# Patient Record
Sex: MALE | NOT HISPANIC OR LATINO | Employment: FULL TIME | URBAN - METROPOLITAN AREA
[De-identification: names, ages, dates, MRNs, and addresses within clinical notes are randomized per-mention and may not be internally consistent; named-entity substitution may affect disease eponyms.]

---

## 2024-02-25 PROCEDURE — 96375 TX/PRO/DX INJ NEW DRUG ADDON: CPT | Performed by: EMERGENCY MEDICINE

## 2024-02-25 PROCEDURE — 96361 HYDRATE IV INFUSION ADD-ON: CPT | Performed by: EMERGENCY MEDICINE

## 2024-02-25 PROCEDURE — 99284 EMERGENCY DEPT VISIT MOD MDM: CPT | Mod: 25 | Performed by: EMERGENCY MEDICINE

## 2024-02-25 PROCEDURE — 96374 THER/PROPH/DIAG INJ IV PUSH: CPT | Performed by: EMERGENCY MEDICINE

## 2024-02-26 ENCOUNTER — APPOINTMENT (OUTPATIENT)
Dept: RADIOLOGY | Facility: HOSPITAL | Age: 29
End: 2024-02-26
Payer: COMMERCIAL

## 2024-02-26 ENCOUNTER — HOSPITAL ENCOUNTER (EMERGENCY)
Facility: HOSPITAL | Age: 29
Discharge: HOME | End: 2024-02-26
Attending: EMERGENCY MEDICINE
Payer: COMMERCIAL

## 2024-02-26 VITALS
DIASTOLIC BLOOD PRESSURE: 89 MMHG | HEIGHT: 69 IN | HEART RATE: 85 BPM | SYSTOLIC BLOOD PRESSURE: 138 MMHG | BODY MASS INDEX: 44.43 KG/M2 | RESPIRATION RATE: 16 BRPM | WEIGHT: 300 LBS | OXYGEN SATURATION: 98 % | TEMPERATURE: 97.5 F

## 2024-02-26 DIAGNOSIS — N23 RENAL COLIC ON LEFT SIDE: Primary | ICD-10-CM

## 2024-02-26 LAB
ALBUMIN SERPL-MCNC: 4.2 G/DL (ref 3.5–5)
ALP BLD-CCNC: 97 U/L (ref 35–125)
ALT SERPL-CCNC: 29 U/L (ref 5–40)
ANION GAP SERPL CALC-SCNC: 12 MMOL/L
APPEARANCE UR: CLEAR
AST SERPL-CCNC: 21 U/L (ref 5–40)
BASOPHILS # BLD AUTO: 0.09 X10*3/UL (ref 0–0.1)
BASOPHILS NFR BLD AUTO: 0.8 %
BILIRUB SERPL-MCNC: 0.6 MG/DL (ref 0.1–1.2)
BILIRUB UR STRIP.AUTO-MCNC: NEGATIVE MG/DL
BUN SERPL-MCNC: 14 MG/DL (ref 8–25)
CALCIUM SERPL-MCNC: 9.2 MG/DL (ref 8.5–10.4)
CHLORIDE SERPL-SCNC: 103 MMOL/L (ref 97–107)
CO2 SERPL-SCNC: 25 MMOL/L (ref 24–31)
COLOR UR: ABNORMAL
CREAT SERPL-MCNC: 1 MG/DL (ref 0.4–1.6)
EGFRCR SERPLBLD CKD-EPI 2021: >90 ML/MIN/1.73M*2
EOSINOPHIL # BLD AUTO: 0.2 X10*3/UL (ref 0–0.7)
EOSINOPHIL NFR BLD AUTO: 1.9 %
ERYTHROCYTE [DISTWIDTH] IN BLOOD BY AUTOMATED COUNT: 13.1 % (ref 11.5–14.5)
GLUCOSE SERPL-MCNC: 108 MG/DL (ref 65–99)
GLUCOSE UR STRIP.AUTO-MCNC: NORMAL MG/DL
HCT VFR BLD AUTO: 42.1 % (ref 41–52)
HGB BLD-MCNC: 14.1 G/DL (ref 13.5–17.5)
HOLD SPECIMEN: NORMAL
IMM GRANULOCYTES # BLD AUTO: 0.03 X10*3/UL (ref 0–0.7)
IMM GRANULOCYTES NFR BLD AUTO: 0.3 % (ref 0–0.9)
KETONES UR STRIP.AUTO-MCNC: NEGATIVE MG/DL
LEUKOCYTE ESTERASE UR QL STRIP.AUTO: NEGATIVE
LIPASE SERPL-CCNC: 29 U/L (ref 16–63)
LYMPHOCYTES # BLD AUTO: 3.17 X10*3/UL (ref 1.2–4.8)
LYMPHOCYTES NFR BLD AUTO: 29.9 %
MCH RBC QN AUTO: 27.2 PG (ref 26–34)
MCHC RBC AUTO-ENTMCNC: 33.5 G/DL (ref 32–36)
MCV RBC AUTO: 81 FL (ref 80–100)
MONOCYTES # BLD AUTO: 0.93 X10*3/UL (ref 0.1–1)
MONOCYTES NFR BLD AUTO: 8.8 %
MUCOUS THREADS #/AREA URNS AUTO: ABNORMAL /LPF
NEUTROPHILS # BLD AUTO: 6.18 X10*3/UL (ref 1.2–7.7)
NEUTROPHILS NFR BLD AUTO: 58.3 %
NITRITE UR QL STRIP.AUTO: NEGATIVE
NRBC BLD-RTO: 0 /100 WBCS (ref 0–0)
PH UR STRIP.AUTO: 5.5 [PH]
PLATELET # BLD AUTO: 308 X10*3/UL (ref 150–450)
POTASSIUM SERPL-SCNC: 4 MMOL/L (ref 3.4–5.1)
PROT SERPL-MCNC: 7.2 G/DL (ref 5.9–7.9)
PROT UR STRIP.AUTO-MCNC: ABNORMAL MG/DL
RBC # BLD AUTO: 5.19 X10*6/UL (ref 4.5–5.9)
RBC # UR STRIP.AUTO: ABNORMAL /UL
RBC #/AREA URNS AUTO: >20 /HPF
SODIUM SERPL-SCNC: 140 MMOL/L (ref 133–145)
SP GR UR STRIP.AUTO: 1.02
UROBILINOGEN UR STRIP.AUTO-MCNC: NORMAL MG/DL
WBC # BLD AUTO: 10.6 X10*3/UL (ref 4.4–11.3)
WBC #/AREA URNS AUTO: ABNORMAL /HPF

## 2024-02-26 PROCEDURE — 85025 COMPLETE CBC W/AUTO DIFF WBC: CPT | Performed by: EMERGENCY MEDICINE

## 2024-02-26 PROCEDURE — 74176 CT ABD & PELVIS W/O CONTRAST: CPT

## 2024-02-26 PROCEDURE — 2500000004 HC RX 250 GENERAL PHARMACY W/ HCPCS (ALT 636 FOR OP/ED): Performed by: EMERGENCY MEDICINE

## 2024-02-26 PROCEDURE — 36415 COLL VENOUS BLD VENIPUNCTURE: CPT | Performed by: EMERGENCY MEDICINE

## 2024-02-26 PROCEDURE — 80053 COMPREHEN METABOLIC PANEL: CPT | Performed by: EMERGENCY MEDICINE

## 2024-02-26 PROCEDURE — 81001 URINALYSIS AUTO W/SCOPE: CPT | Performed by: EMERGENCY MEDICINE

## 2024-02-26 PROCEDURE — 83690 ASSAY OF LIPASE: CPT | Performed by: EMERGENCY MEDICINE

## 2024-02-26 PROCEDURE — 74176 CT ABD & PELVIS W/O CONTRAST: CPT | Mod: FOREIGN READ | Performed by: RADIOLOGY

## 2024-02-26 RX ORDER — OXYCODONE AND ACETAMINOPHEN 5; 325 MG/1; MG/1
1 TABLET ORAL EVERY 6 HOURS PRN
Qty: 5 TABLET | Refills: 0 | Status: SHIPPED | OUTPATIENT
Start: 2024-02-26 | End: 2024-02-29

## 2024-02-26 RX ORDER — KETOROLAC TROMETHAMINE 30 MG/ML
30 INJECTION, SOLUTION INTRAMUSCULAR; INTRAVENOUS ONCE
Status: COMPLETED | OUTPATIENT
Start: 2024-02-26 | End: 2024-02-26

## 2024-02-26 RX ORDER — KETOROLAC TROMETHAMINE 10 MG/1
10 TABLET, FILM COATED ORAL EVERY 6 HOURS PRN
Qty: 20 TABLET | Refills: 0 | Status: SHIPPED | OUTPATIENT
Start: 2024-02-26 | End: 2024-03-02

## 2024-02-26 RX ORDER — ONDANSETRON HYDROCHLORIDE 2 MG/ML
4 INJECTION, SOLUTION INTRAVENOUS ONCE
Status: COMPLETED | OUTPATIENT
Start: 2024-02-26 | End: 2024-02-26

## 2024-02-26 RX ADMIN — SODIUM CHLORIDE 1000 ML: 900 INJECTION, SOLUTION INTRAVENOUS at 00:27

## 2024-02-26 RX ADMIN — ONDANSETRON 4 MG: 2 INJECTION INTRAMUSCULAR; INTRAVENOUS at 00:26

## 2024-02-26 RX ADMIN — KETOROLAC TROMETHAMINE 30 MG: 30 INJECTION INTRAMUSCULAR; INTRAVENOUS at 00:27

## 2024-02-26 ASSESSMENT — PAIN SCALES - GENERAL: PAINLEVEL_OUTOF10: 4

## 2024-02-26 ASSESSMENT — COLUMBIA-SUICIDE SEVERITY RATING SCALE - C-SSRS
6. HAVE YOU EVER DONE ANYTHING, STARTED TO DO ANYTHING, OR PREPARED TO DO ANYTHING TO END YOUR LIFE?: NO
1. IN THE PAST MONTH, HAVE YOU WISHED YOU WERE DEAD OR WISHED YOU COULD GO TO SLEEP AND NOT WAKE UP?: NO
2. HAVE YOU ACTUALLY HAD ANY THOUGHTS OF KILLING YOURSELF?: NO

## 2024-02-26 ASSESSMENT — PAIN DESCRIPTION - DESCRIPTORS
DESCRIPTORS: SHARP
DESCRIPTORS: SHARP

## 2024-02-26 ASSESSMENT — PAIN DESCRIPTION - LOCATION: LOCATION: ABDOMEN

## 2024-02-26 ASSESSMENT — PAIN DESCRIPTION - PAIN TYPE: TYPE: ACUTE PAIN

## 2024-02-26 ASSESSMENT — PAIN - FUNCTIONAL ASSESSMENT: PAIN_FUNCTIONAL_ASSESSMENT: 0-10

## 2024-02-26 ASSESSMENT — PAIN DESCRIPTION - FREQUENCY: FREQUENCY: CONSTANT/CONTINUOUS

## 2024-02-26 ASSESSMENT — PAIN DESCRIPTION - PROGRESSION: CLINICAL_PROGRESSION: GRADUALLY WORSENING

## 2024-02-26 ASSESSMENT — PAIN DESCRIPTION - ORIENTATION: ORIENTATION: LEFT;LOWER

## 2024-02-26 NOTE — Clinical Note
Braulio Rubi was seen and treated in our emergency department on 2/25/2024.  He may return to work on 02/27/2024.  Patient may return to work on the above date without restrictions     If you have any questions or concerns, please don't hesitate to call.      Sukhjinder Mccoy, DO

## 2024-02-26 NOTE — ED PROVIDER NOTES
HPI   Chief Complaint   Patient presents with    Abdominal Pain       Patient presents to the emergency department today with complaints of having pain to his left side.  The patient states that he had a history of kidney stones in the past.  The patient states last kidney stone was a number of years ago.  The patient states that he feels as though this pain is similar.  The patient states it started on his left side seem to be moving around to the left lower quadrant.  The patient states that he is having difficult time passing urine.  Patient states that he feels weak and dizzy.  Patient states that his pain level at the present time is an 8/10.  Patient states that it will go as high as 10/10.  Patient states that the pain has not radiated to the back and basically started in the left side.  Patient denies any recent injury.  Patient denies decreased appetite.  Patient states that he has had episodes of nausea and vomiting, but is mainly associated when the pain is severe.  Patient does not have a urologist that he follows up with as an outpatient       No data recorded        Patient History   No past medical history on file.  No past surgical history on file.  No family history on file.  Social History     Tobacco Use    Smoking status: Not on file    Smokeless tobacco: Not on file   Substance Use Topics    Alcohol use: Not on file    Drug use: Not on file       Physical Exam   ED Triage Vitals [02/26/24 0002]   Temperature Heart Rate Respirations BP   36.4 °C (97.5 °F) 85 16 138/89      Pulse Ox Temp Source Heart Rate Source Patient Position   98 % Oral Monitor --      BP Location FiO2 (%)     -- --       Physical Exam  Vitals and nursing note reviewed.   Constitutional:       General: He is not in acute distress.     Appearance: He is well-developed.   HENT:      Head: Normocephalic and atraumatic.   Eyes:      Conjunctiva/sclera: Conjunctivae normal.   Cardiovascular:      Rate and Rhythm: Normal rate and  regular rhythm.      Heart sounds: No murmur heard.  Pulmonary:      Effort: Pulmonary effort is normal. No respiratory distress.      Breath sounds: Normal breath sounds.   Abdominal:      Palpations: Abdomen is soft.      Tenderness: There is no abdominal tenderness.   Musculoskeletal:         General: No swelling.      Cervical back: Neck supple.   Skin:     General: Skin is warm and dry.      Capillary Refill: Capillary refill takes less than 2 seconds.   Neurological:      Mental Status: He is alert.   Psychiatric:         Mood and Affect: Mood normal.         ED Course & [facility]   ED Course as of 02/26/24 0138 Mon Feb 26, 2024 0133 Go over the patient's test results with him, he is aware the fact that the stone appears to have already passed.  Patient did have a stone in the urine that he had just put out.  Since there is no other significant maladies on CT scan the patient can be safely discharged home.  Patient also made aware the fact that his liver enzymes and his gallbladder appear to be normal. [FR]      ED Course User Index  [FR] Sukhjinder Mccoy DO         Diagnoses as of 02/26/24 0138   Renal colic on left side       Medical Decision Making  After my initial evaluation, patient will need to go for CT scan of the abdomen pelvis throughout possibility of an impacted kidney stone.  Patient will be given medication to help with the symptoms, I will start patient on IV fluids as well.    Amount and/or Complexity of Data Reviewed  Labs:  Decision-making details documented in ED Course.  Radiology:  Decision-making details documented in ED Course.      Labs Reviewed   COMPREHENSIVE METABOLIC PANEL - Abnormal       Result Value    Glucose 108 (*)     Sodium 140      Potassium 4.0      Chloride 103      Bicarbonate 25      Urea Nitrogen 14      Creatinine 1.00      eGFR >90      Calcium 9.2      Albumin 4.2      Alkaline Phosphatase 97      Total Protein 7.2      AST 21      Bilirubin, Total 0.6      ALT 29       Anion Gap 12     URINALYSIS WITH REFLEX CULTURE AND MICROSCOPIC - Abnormal    Color, Urine Light-Yellow      Appearance, Urine Clear      Specific Gravity, Urine 1.022      pH, Urine 5.5      Protein, Urine 10 (TRACE)      Glucose, Urine Normal      Blood, Urine OVER (3+) (*)     Ketones, Urine NEGATIVE      Bilirubin, Urine NEGATIVE      Urobilinogen, Urine Normal      Nitrite, Urine NEGATIVE      Leukocyte Esterase, Urine NEGATIVE     URINALYSIS MICROSCOPIC WITH REFLEX CULTURE - Abnormal    WBC, Urine 1-5      RBC, Urine >20 (*)     Mucus, Urine FEW     LIPASE - Normal    Lipase 29     CBC WITH AUTO DIFFERENTIAL    WBC 10.6      nRBC 0.0      RBC 5.19      Hemoglobin 14.1      Hematocrit 42.1      MCV 81      MCH 27.2      MCHC 33.5      RDW 13.1      Platelets 308      Neutrophils % 58.3      Immature Granulocytes %, Automated 0.3      Lymphocytes % 29.9      Monocytes % 8.8      Eosinophils % 1.9      Basophils % 0.8      Neutrophils Absolute 6.18      Immature Granulocytes Absolute, Automated 0.03      Lymphocytes Absolute 3.17      Monocytes Absolute 0.93      Eosinophils Absolute 0.20      Basophils Absolute 0.09     URINALYSIS WITH REFLEX CULTURE AND MICROSCOPIC    Narrative:     The following orders were created for panel order Urinalysis with Reflex Culture and Microscopic.  Procedure                               Abnormality         Status                     ---------                               -----------         ------                     Urinalysis with Reflex C...[[id number]]  Abnormal            Final result               Extra Urine Gray Tube[[id number]]                            In process                   Please view results for these tests on the individual orders.   EXTRA URINE GRAY TUBE      CT abdomen pelvis wo IV contrast   Final Result   No acute intra-abdominal pathology identified.  No evidence of renal   or ureteral obstruction.   Nonobstructing 1 mm calculus in the lower pole of  the left kidney.   Signed by Jeramy Sullivan           Procedure  Procedures     Sukhjinder Mccoy,   02/26/24 0138

## 2024-05-13 ENCOUNTER — APPOINTMENT (OUTPATIENT)
Dept: RADIOLOGY | Facility: HOSPITAL | Age: 29
End: 2024-05-13
Payer: COMMERCIAL

## 2024-05-13 ENCOUNTER — HOSPITAL ENCOUNTER (EMERGENCY)
Facility: HOSPITAL | Age: 29
Discharge: HOME | End: 2024-05-13
Payer: COMMERCIAL

## 2024-05-13 VITALS
SYSTOLIC BLOOD PRESSURE: 135 MMHG | RESPIRATION RATE: 18 BRPM | HEART RATE: 83 BPM | TEMPERATURE: 98.6 F | HEIGHT: 69 IN | DIASTOLIC BLOOD PRESSURE: 94 MMHG | WEIGHT: 290 LBS | OXYGEN SATURATION: 100 % | BODY MASS INDEX: 42.95 KG/M2

## 2024-05-13 DIAGNOSIS — S63.502A SPRAIN OF LEFT WRIST, INITIAL ENCOUNTER: ICD-10-CM

## 2024-05-13 DIAGNOSIS — S62.102A CLOSED FRACTURE OF LEFT WRIST, INITIAL ENCOUNTER: Primary | ICD-10-CM

## 2024-05-13 PROCEDURE — 73130 X-RAY EXAM OF HAND: CPT | Mod: LT

## 2024-05-13 PROCEDURE — 99284 EMERGENCY DEPT VISIT MOD MDM: CPT

## 2024-05-13 PROCEDURE — 73130 X-RAY EXAM OF HAND: CPT | Mod: LEFT SIDE | Performed by: STUDENT IN AN ORGANIZED HEALTH CARE EDUCATION/TRAINING PROGRAM

## 2024-05-13 PROCEDURE — 29125 APPL SHORT ARM SPLINT STATIC: CPT | Mod: LT

## 2024-05-13 PROCEDURE — 73110 X-RAY EXAM OF WRIST: CPT | Mod: LT

## 2024-05-13 PROCEDURE — 73110 X-RAY EXAM OF WRIST: CPT | Mod: LEFT SIDE | Performed by: STUDENT IN AN ORGANIZED HEALTH CARE EDUCATION/TRAINING PROGRAM

## 2024-05-13 RX ORDER — IBUPROFEN 600 MG/1
600 TABLET ORAL EVERY 6 HOURS PRN
Qty: 15 TABLET | Refills: 0 | Status: SHIPPED | OUTPATIENT
Start: 2024-05-13 | End: 2024-05-20

## 2024-05-13 ASSESSMENT — COLUMBIA-SUICIDE SEVERITY RATING SCALE - C-SSRS
2. HAVE YOU ACTUALLY HAD ANY THOUGHTS OF KILLING YOURSELF?: NO
1. IN THE PAST MONTH, HAVE YOU WISHED YOU WERE DEAD OR WISHED YOU COULD GO TO SLEEP AND NOT WAKE UP?: NO
6. HAVE YOU EVER DONE ANYTHING, STARTED TO DO ANYTHING, OR PREPARED TO DO ANYTHING TO END YOUR LIFE?: NO

## 2024-05-13 ASSESSMENT — PAIN - FUNCTIONAL ASSESSMENT: PAIN_FUNCTIONAL_ASSESSMENT: 0-10

## 2024-05-13 ASSESSMENT — PAIN SCALES - GENERAL: PAINLEVEL_OUTOF10: 4

## 2024-05-13 NOTE — ED TRIAGE NOTES
Pt reports yesterday he fell on right hand playing baseball. Pt states his pointer finger/ thumb area of  hand is most tender.

## 2024-05-13 NOTE — DISCHARGE INSTRUCTIONS
Be sure to follow up as directed in 1-2 days.  All of the details of your follow up instructions are detailed in the follow up section of this packet.   You have been splinted here in the Emergency Department.      You should NOT take this splint off until seen by a specialist for further evaluation as discussed.  Allow for the specialist to remove the splint.  Keep splint clean and dry.  If you wish to shower, use a garbage bag or other such method to ensure the splint stays dry.        Be sure to return to the ER without delay if you feel like your splint is too tight, your fingers/toes are turning colors or you begin to loose sensation or motor function of the affected area.      If you have been provided with a sling (based on the specific injury) wear the sling as often as possible and ensure prompt follow as directed.  *(if you have not been provided with a sling please disregard)    It is important to remember that your care does not end here and you must continue to monitor your condition closely. Please return to the emergency department for any worsening or concerning signs or symptoms as directed by our conversations and the discharge instructions. Otherwise please follow up with your doctor in 2 days if no better or worse. If you do not have a doctor please contact the referral number on your discharge instructions. Please contact any physician specialists provided in your discharge notes as it is very important to follow up with them regarding your condition. If you are unable to reach the physicians provided, please come back to the Emergency Department at any time.        Return to emergency room without delay for ANY new or worsening pains or for any other symptoms or concerns.

## 2024-05-13 NOTE — ED PROVIDER NOTES
HPI   Chief Complaint   Patient presents with    Hand Injury       HPI  28-year-old here for evaluation of left hand injury, tried to catch a ball while playing baseball, denies any other location of pain.  Movement but causes mild discomfort in the left hand.                  [place] Coma Scale Score: 15                     Patient History   No past medical history on file.  No past surgical history on file.  No family history on file.  Social History     Tobacco Use    Smoking status: Not on file    Smokeless tobacco: Not on file   Substance Use Topics    Alcohol use: Not on file    Drug use: Not on file       Physical Exam   ED Triage Vitals [05/13/24 1355]   Temperature Heart Rate Respirations BP   37 °C (98.6 °F) 86 18 (!) 148/93      Pulse Ox Temp Source Heart Rate Source Patient Position   98 % Tympanic -- --      BP Location FiO2 (%)     -- --       Physical Exam  GENERAL APPEARANCE: This patient is in no acute respiratory distress. Awake and alert.talking appropriately. Answering questions appropriately. No evidence of pressured speech     VITAL SIGNS: As per the nurses' triage record.     HEENT: Normocephalic, atraumatic.     NECK:  full gross ROM during exam    MUSCULOSKELETAL: Left hand mildly tender, distal sensation and motor function intact.  Full gross active range of motion. Ambulating on own with no acute difficulties     NEUROLOGICAL: Awake, alert and oriented x 3.    IMMUNOLOGICAL: No palpable lymphadenopathy or lymphatic streaking noted on visible skin.    DERM: No petechiae, rashes, or ecchymoses. on visible skin    PSYCH: mood and affect appear normal.    ED Course & MDM   Diagnoses as of 05/13/24 1533   Closed fracture of left wrist, initial encounter   Sprain of left wrist, initial encounter       Medical Decision Making  Parts of this chart have been completed using voice recognition software. Please excuse any errors of transcription.  My thought process and reason for plan has been  formulated from the time that I saw the patient until the time of disposition and is not specific to one specific moment during their visit and furthermore my MDM encompasses this entire chart and not only this text box.      HPI: Detailed above.    Exam: A medically appropriate exam performed, outlined above, given the known history and presentation.    History Limited by: Nothing    History obtained from: Patient    External/internal records reviewed: No external records reviewed    Social Determinants of Health considered during this visit: At home    Chronic conditions impacting care: None    Medications given during visit:  Medications - No data to display     Diagnostic/tests  Labs Reviewed - No data to display   XR hand left 3+ views   Final Result   Questionable linear lucency through scaphoid waist as described   above. This could be artifactual, however subtle nondisplaced   fracture can also be considered in the differential. Recommend   correlation with point tenderness in this location. A follow-up   radiograph can be obtained in 7-10 days for definitive evaluation.             MACRO:   None        Signed by: Cristina Theodore 5/13/2024 3:22 PM   Dictation workstation:   [id number]      XR wrist left 3+ views   Final Result   Questionable linear lucency through scaphoid waist as described   above. This could be artifactual, however subtle nondisplaced   fracture can also be considered in the differential. Recommend   correlation with point tenderness in this location. A follow-up   radiograph can be obtained in 7-10 days for definitive evaluation.             MACRO:   None        Signed by: Cristina Theodore 5/13/2024 3:22 PM   Dictation workstation:   [id number]             Considerations/further MDM:  I estimate there is low risk severe fracture/dislocation requiring admission.  I have accessed for and considered: Fracture, dislocation, compartment syndrome, DVT, arterial involvement, arthritis,  tendon or neurovascular compromise or dysfunction, cellulitis, dislocation.     Thus I consider the discharge disposition reasonable.     We have discussed the diagnosis and risks, and we agree with discharging home to follow-up with their primary doctor or the referral orthopedist. Close follow up will be provided, along with my concerns and risks for the patient if they choose not to follow up as directed.      Out of concern for possible scaphoid fracture a thumb spica Ortho-Glass splint will be given and close return precautions follow-up instructions to orthopedic specialist,     definitive fracture management for the scaphoid fracture provided.    Splint placed by ED staff, after placement I inspected, good positioning and immobilization, the patient tolerated well, there is good anatomical positioning and immobilization with good neurovascular status postop placement of splint    Procedure  Procedures     Duane Ham PA-C  05/13/24 1535

## 2025-02-24 ENCOUNTER — APPOINTMENT (OUTPATIENT)
Dept: RADIOLOGY | Facility: HOSPITAL | Age: 30
End: 2025-02-24
Payer: COMMERCIAL

## 2025-02-24 ENCOUNTER — APPOINTMENT (OUTPATIENT)
Dept: CARDIOLOGY | Facility: HOSPITAL | Age: 30
End: 2025-02-24
Payer: COMMERCIAL

## 2025-02-24 ENCOUNTER — HOSPITAL ENCOUNTER (EMERGENCY)
Facility: HOSPITAL | Age: 30
Discharge: HOME | End: 2025-02-25
Attending: STUDENT IN AN ORGANIZED HEALTH CARE EDUCATION/TRAINING PROGRAM
Payer: COMMERCIAL

## 2025-02-24 DIAGNOSIS — R07.9 CHEST PAIN, UNSPECIFIED TYPE: Primary | ICD-10-CM

## 2025-02-24 DIAGNOSIS — K76.0 FATTY LIVER: ICD-10-CM

## 2025-02-24 DIAGNOSIS — K44.9 HIATAL HERNIA: ICD-10-CM

## 2025-02-24 LAB
ALBUMIN SERPL BCP-MCNC: 4.1 G/DL (ref 3.4–5)
ALP SERPL-CCNC: 79 U/L (ref 33–120)
ALT SERPL W P-5'-P-CCNC: 28 U/L (ref 10–52)
ANION GAP SERPL CALCULATED.3IONS-SCNC: 13 MMOL/L (ref 10–20)
AST SERPL W P-5'-P-CCNC: 15 U/L (ref 9–39)
BASOPHILS # BLD AUTO: 0.04 X10*3/UL (ref 0–0.1)
BASOPHILS NFR BLD AUTO: 0.6 %
BILIRUB SERPL-MCNC: 1.4 MG/DL (ref 0–1.2)
BUN SERPL-MCNC: 13 MG/DL (ref 6–23)
CALCIUM SERPL-MCNC: 8.9 MG/DL (ref 8.6–10.3)
CARDIAC TROPONIN I PNL SERPL HS: 4 NG/L (ref 0–20)
CHLORIDE SERPL-SCNC: 103 MMOL/L (ref 98–107)
CO2 SERPL-SCNC: 24 MMOL/L (ref 21–32)
CREAT SERPL-MCNC: 0.95 MG/DL (ref 0.5–1.3)
D DIMER PPP FEU-MCNC: 1.01 MG/L FEU (ref 0.19–0.5)
EGFRCR SERPLBLD CKD-EPI 2021: >90 ML/MIN/1.73M*2
EOSINOPHIL # BLD AUTO: 0.07 X10*3/UL (ref 0–0.7)
EOSINOPHIL NFR BLD AUTO: 1 %
ERYTHROCYTE [DISTWIDTH] IN BLOOD BY AUTOMATED COUNT: 13.8 % (ref 11.5–14.5)
FLUAV RNA RESP QL NAA+PROBE: NOT DETECTED
FLUBV RNA RESP QL NAA+PROBE: NOT DETECTED
GLUCOSE SERPL-MCNC: 117 MG/DL (ref 74–99)
HCT VFR BLD AUTO: 43.3 % (ref 41–52)
HGB BLD-MCNC: 14.4 G/DL (ref 13.5–17.5)
IMM GRANULOCYTES # BLD AUTO: 0.04 X10*3/UL (ref 0–0.7)
IMM GRANULOCYTES NFR BLD AUTO: 0.6 % (ref 0–0.9)
LYMPHOCYTES # BLD AUTO: 0.95 X10*3/UL (ref 1.2–4.8)
LYMPHOCYTES NFR BLD AUTO: 13.6 %
MCH RBC QN AUTO: 26.7 PG (ref 26–34)
MCHC RBC AUTO-ENTMCNC: 33.3 G/DL (ref 32–36)
MCV RBC AUTO: 80 FL (ref 80–100)
MONOCYTES # BLD AUTO: 0.87 X10*3/UL (ref 0.1–1)
MONOCYTES NFR BLD AUTO: 12.4 %
NEUTROPHILS # BLD AUTO: 5.02 X10*3/UL (ref 1.2–7.7)
NEUTROPHILS NFR BLD AUTO: 71.8 %
NRBC BLD-RTO: 0 /100 WBCS (ref 0–0)
PLATELET # BLD AUTO: 290 X10*3/UL (ref 150–450)
POTASSIUM SERPL-SCNC: 3.5 MMOL/L (ref 3.5–5.3)
PROT SERPL-MCNC: 7.2 G/DL (ref 6.4–8.2)
RBC # BLD AUTO: 5.39 X10*6/UL (ref 4.5–5.9)
SARS-COV-2 RNA RESP QL NAA+PROBE: NOT DETECTED
SODIUM SERPL-SCNC: 136 MMOL/L (ref 136–145)
WBC # BLD AUTO: 7 X10*3/UL (ref 4.4–11.3)

## 2025-02-24 PROCEDURE — 85300 ANTITHROMBIN III ACTIVITY: CPT | Performed by: PHYSICIAN ASSISTANT

## 2025-02-24 PROCEDURE — 87636 SARSCOV2 & INF A&B AMP PRB: CPT | Performed by: PHYSICIAN ASSISTANT

## 2025-02-24 PROCEDURE — 71045 X-RAY EXAM CHEST 1 VIEW: CPT

## 2025-02-24 PROCEDURE — 36415 COLL VENOUS BLD VENIPUNCTURE: CPT | Performed by: PHYSICIAN ASSISTANT

## 2025-02-24 PROCEDURE — 2500000001 HC RX 250 WO HCPCS SELF ADMINISTERED DRUGS (ALT 637 FOR MEDICARE OP): Performed by: STUDENT IN AN ORGANIZED HEALTH CARE EDUCATION/TRAINING PROGRAM

## 2025-02-24 PROCEDURE — 80053 COMPREHEN METABOLIC PANEL: CPT | Performed by: PHYSICIAN ASSISTANT

## 2025-02-24 PROCEDURE — 71045 X-RAY EXAM CHEST 1 VIEW: CPT | Performed by: RADIOLOGY

## 2025-02-24 PROCEDURE — 93005 ELECTROCARDIOGRAM TRACING: CPT

## 2025-02-24 PROCEDURE — 2500000004 HC RX 250 GENERAL PHARMACY W/ HCPCS (ALT 636 FOR OP/ED): Performed by: PHYSICIAN ASSISTANT

## 2025-02-24 PROCEDURE — 85025 COMPLETE CBC W/AUTO DIFF WBC: CPT | Performed by: PHYSICIAN ASSISTANT

## 2025-02-24 PROCEDURE — 99285 EMERGENCY DEPT VISIT HI MDM: CPT | Mod: 25 | Performed by: STUDENT IN AN ORGANIZED HEALTH CARE EDUCATION/TRAINING PROGRAM

## 2025-02-24 PROCEDURE — 96360 HYDRATION IV INFUSION INIT: CPT

## 2025-02-24 PROCEDURE — 2500000005 HC RX 250 GENERAL PHARMACY W/O HCPCS: Performed by: STUDENT IN AN ORGANIZED HEALTH CARE EDUCATION/TRAINING PROGRAM

## 2025-02-24 PROCEDURE — 84484 ASSAY OF TROPONIN QUANT: CPT | Performed by: PHYSICIAN ASSISTANT

## 2025-02-24 RX ORDER — LIDOCAINE HYDROCHLORIDE 20 MG/ML
15 SOLUTION OROPHARYNGEAL ONCE
Status: COMPLETED | OUTPATIENT
Start: 2025-02-24 | End: 2025-02-24

## 2025-02-24 RX ORDER — ALUMINUM HYDROXIDE, MAGNESIUM HYDROXIDE, AND SIMETHICONE 1200; 120; 1200 MG/30ML; MG/30ML; MG/30ML
30 SUSPENSION ORAL ONCE
Status: COMPLETED | OUTPATIENT
Start: 2025-02-24 | End: 2025-02-24

## 2025-02-24 RX ADMIN — SODIUM CHLORIDE 1000 ML: 900 INJECTION, SOLUTION INTRAVENOUS at 23:09

## 2025-02-24 RX ADMIN — ALUMINUM HYDROXIDE, MAGNESIUM HYDROXIDE, AND SIMETHICONE 30 ML: 200; 200; 20 SUSPENSION ORAL at 23:13

## 2025-02-24 RX ADMIN — LIDOCAINE HYDROCHLORIDE 15 ML: 20 SOLUTION ORAL at 23:13

## 2025-02-24 ASSESSMENT — PAIN - FUNCTIONAL ASSESSMENT: PAIN_FUNCTIONAL_ASSESSMENT: 0-10

## 2025-02-24 ASSESSMENT — COLUMBIA-SUICIDE SEVERITY RATING SCALE - C-SSRS
6. HAVE YOU EVER DONE ANYTHING, STARTED TO DO ANYTHING, OR PREPARED TO DO ANYTHING TO END YOUR LIFE?: NO
2. HAVE YOU ACTUALLY HAD ANY THOUGHTS OF KILLING YOURSELF?: NO
1. IN THE PAST MONTH, HAVE YOU WISHED YOU WERE DEAD OR WISHED YOU COULD GO TO SLEEP AND NOT WAKE UP?: NO

## 2025-02-24 ASSESSMENT — PAIN DESCRIPTION - LOCATION
LOCATION: CHEST
LOCATION: CHEST

## 2025-02-24 ASSESSMENT — PAIN SCALES - PAIN ASSESSMENT IN ADVANCED DEMENTIA (PAINAD): TOTALSCORE: MEDICATION (SEE MAR)

## 2025-02-24 ASSESSMENT — PAIN SCALES - GENERAL
PAINLEVEL_OUTOF10: 3
PAINLEVEL_OUTOF10: 5 - MODERATE PAIN

## 2025-02-24 ASSESSMENT — PAIN DESCRIPTION - ORIENTATION: ORIENTATION: MID

## 2025-02-24 NOTE — LETTER
February 25, 2025    Patient: Braulio Rubi   YOB: 1995   Date of Visit: 2/24/2025       To Whom It May Concern:    Braulio Rubi was seen and treated in our emergency department on 2/24/2025. He may return back to work on 2/26/2025.     If you have any questions or concerns, please don't hesitate to call.     Dr. Irving Griffin, DO    CC: No Recipients

## 2025-02-25 ENCOUNTER — APPOINTMENT (OUTPATIENT)
Dept: RADIOLOGY | Facility: HOSPITAL | Age: 30
End: 2025-02-25
Payer: COMMERCIAL

## 2025-02-25 VITALS
HEIGHT: 69 IN | DIASTOLIC BLOOD PRESSURE: 89 MMHG | BODY MASS INDEX: 46.65 KG/M2 | HEART RATE: 78 BPM | RESPIRATION RATE: 15 BRPM | WEIGHT: 315 LBS | TEMPERATURE: 98.4 F | OXYGEN SATURATION: 97 % | SYSTOLIC BLOOD PRESSURE: 150 MMHG

## 2025-02-25 LAB
ATRIAL RATE: 100 BPM
CARDIAC TROPONIN I PNL SERPL HS: 4 NG/L (ref 0–20)
P AXIS: 54 DEGREES
P OFFSET: 199 MS
P ONSET: 151 MS
PR INTERVAL: 132 MS
Q ONSET: 217 MS
QRS COUNT: 17 BEATS
QRS DURATION: 90 MS
QT INTERVAL: 348 MS
QTC CALCULATION(BAZETT): 448 MS
QTC FREDERICIA: 412 MS
R AXIS: 49 DEGREES
T AXIS: 157 DEGREES
T OFFSET: 391 MS
VENTRICULAR RATE: 100 BPM

## 2025-02-25 PROCEDURE — 36415 COLL VENOUS BLD VENIPUNCTURE: CPT | Performed by: PHYSICIAN ASSISTANT

## 2025-02-25 PROCEDURE — 2550000001 HC RX 255 CONTRASTS: Performed by: STUDENT IN AN ORGANIZED HEALTH CARE EDUCATION/TRAINING PROGRAM

## 2025-02-25 PROCEDURE — 71275 CT ANGIOGRAPHY CHEST: CPT

## 2025-02-25 PROCEDURE — 84484 ASSAY OF TROPONIN QUANT: CPT | Performed by: PHYSICIAN ASSISTANT

## 2025-02-25 RX ADMIN — IOHEXOL 75 ML: 350 INJECTION, SOLUTION INTRAVENOUS at 01:04

## 2025-02-25 ASSESSMENT — HEART SCORE
RISK FACTORS: 1-2 RISK FACTORS
AGE: <45
ECG: NORMAL
TROPONIN: LESS THAN OR EQUAL TO NORMAL LIMIT
HISTORY: SLIGHTLY SUSPICIOUS
HEART SCORE: 1

## 2025-02-25 ASSESSMENT — LIFESTYLE VARIABLES
EVER FELT BAD OR GUILTY ABOUT YOUR DRINKING: NO
HAVE PEOPLE ANNOYED YOU BY CRITICIZING YOUR DRINKING: NO
HAVE YOU EVER FELT YOU SHOULD CUT DOWN ON YOUR DRINKING: NO
TOTAL SCORE: 0
EVER HAD A DRINK FIRST THING IN THE MORNING TO STEADY YOUR NERVES TO GET RID OF A HANGOVER: NO

## 2025-02-25 ASSESSMENT — PAIN SCALES - GENERAL
PAINLEVEL_OUTOF10: 0 - NO PAIN
PAINLEVEL_OUTOF10: 0 - NO PAIN

## 2025-02-25 NOTE — DISCHARGE INSTRUCTIONS
Follow up with your primary care physician in the next 24-48 hours to schedule a follow up appointment. Return to the ED for any new or concerning symptoms including but not limited to chest pain with associated shortness of breath, sweating, chest pain that radiates down the arm or into the back or the neck, chest pain that gets worse with exertion and better with rest, or severe worsening of your symptoms.     Based on the HEART score for risk stratification and chest pain patient's your estimated risk for a major adverse cardiac event in the next 6 weeks is <1% based on your medical history, family history, and other risk factors as well as her results today.  Your findings today do not mean that you do not have heart disease, simply that you are at low risk for major complication in the next 6 weeks and there was no evidence of heart attack/acute coronary syndrome today on your visit to the ED.    If you do not have a primary doctor to follow-up with, you can make an appointment with Dr. Hopkins using information provided to call the office to set one up.

## 2025-02-25 NOTE — ED TRIAGE NOTES
Pt comes in with mid sternal chest pain radiating to the back with SOB that started about an hour ago, Pt has has had flu like symptoms since yesterday

## 2025-02-25 NOTE — ED NOTES
VSS. Reports pain to be resolved. D.c. papers given. Work note given. Pt advised on oral water hydration, taking OTC pepcid daily. Follow up with PCP, get referral for cardiology and GI. Return back to ED immediately should symptoms change/worsen in anyway. Gait steady to lobby. Home with wife.      Ezekiel Devlin RN  02/25/25 0739

## 2025-02-25 NOTE — ED PROVIDER NOTES
HPI   Chief Complaint   Patient presents with    Chest Pain       This is a 29-year-old male presenting the ED for evaluation of chest pain.  He said flulike symptoms including nausea vomiting and diarrhea for the past 12 hours or so.  His wife and daughter have had diarrhea for a few days this weekend, their symtpoms have resolved and now he has had multiple episodes of vomiting and diarrhea today throughout the day.  He admits to body aches but no fevers.  No cough or congestion, no sore throat or upper respiratory symptoms.  He states that he has developed a sharp pain in his chest centrally that he states seems worse when he takes a deep breath or coughs.  This has been persistent since it began a couple of hours ago.  Does not radiate into the arms or neck but he states that he feels that his back somewhat.  He is never had similar pain before.  He has a family history of heart disease in his grandfather, he is overweight, otherwise does not have a history of hypertension, hyperlipidemia, diabetes, is not a non-smoker, he has no other CAD risk factors.        History provided by:  Patient   used: No            Patient History   No past medical history on file.  No past surgical history on file.  No family history on file.  Social History     Tobacco Use    Smoking status: Not on file    Smokeless tobacco: Not on file   Substance Use Topics    Alcohol use: Not on file    Drug use: Not on file       Physical Exam   ED Triage Vitals [02/24/25 2054]   Temperature Heart Rate Respirations BP   36.9 °C (98.4 °F) (!) 103 18 (!) 132/98      Pulse Ox Temp src Heart Rate Source Patient Position   100 % -- -- --      BP Location FiO2 (%)     -- --       Physical Exam  General: well developed, obese adult male who is awake and alert, significant other at bedside.  In no acute distress.HENT: normocephalic, atraumatic.  CV: regular rate and rhythm, no murmur, no gallops, or rubs.   Resp: clear to  ascultation bilaterally, no wheezes, rales, or rhonchi  GI: abdomen soft, nontender without rigidity or guarding, no peritoneal signs, abdomen is nondistended, no masses palpated  MSK: no swelling of the extremities.  Psych: appropriate mood and affect, cooperative with exam  Skin: warm, dry, without evidence of rash or abrasions    ED Course & MDM   ED Course as of 02/25/25 0216 Mon Feb 24, 2025 2100 ECG 12 Lead  Performed at  2057, HR of 100, NSR, NAD, QTc 448, no sign of STEMI, T wave inversion to leads I, II, aVL, V4-V6    Reviewed and interpreted by me at time performed   [JM]   2208 EKG on my interpretation shows normal sinus rhythm, rate of 100 beats minute.  Normal axis.  QTc 448 ms, ID interval 132.  There are diffuse T wave inversions in the inferolateral, anterior leads.  No ST elevation.  No STEMI. [NT]   2351 I did review the chest x-ray image and do not see any infiltrates concerning for pneumonia, pneumothorax or other acute process in the chest.  Radiology confirms this. [NT]   2352 D-dimer is positive, CTA ordered to evaluate for PE [NT]      ED Course User Index  [JM] Audelia Hendricks MD  [NT] Irving Griffin,          Diagnoses as of 02/25/25 0216   Chest pain, unspecified type   Hiatal hernia   Fatty liver                 No data recorded                                 Medical Decision Making  The patient is in no acute distress on arrival. He is midlly tachycardic at 103bpm on arrival, mildly hypertensive, vitals otherwise normal.  Cardiac workup obtained to assess for evidence of ACS, D-dimer due to his tachycardia to rule out PE, chest x-ray on viral testing to assess for COVID and flu.  This may be due to Radha-Powers tear or muscle strain from his vomiting throughout the day.  He denies any nausea or vomiting now.  GI cocktail given for initial treatment as well as IV fluids.    Patient's pain did improve somewhat, he has minimal persistent discomfort on reassessment.   "Troponin negative x 2 and EKG shows no evidence of acute ischemia.  No evidence of ACS or evolving ischemia today.  COVID and flu testing was negative, the remainder of his workup was unremarkable with the exception of elevated D-dimer.  Because of his he was sent for CTA to evaluate for evidence of PE..  I did review the chest x-ray image and do not see any infiltrates concerning for pneumonia, pneumothorax or other acute process in the chest.  Radiology confirms this.    CT angiogram shows no evidence of PE or obvious aortic pathology.  He does have incidentally found small hiatal hernia as well as hepatic steatosis.  LFTs normal today.  He was advised to follow-up with primary care as an outpatient for monitoring of this.  Heart score is 1 given his known risk factors of family history and obesity.  Low risk for major adverse cardiac event is estimated based on his otherwise normal workup today.  He was discharged and was recommended for outpatient follow up per HEART score protocol.      I utilized an evidence-based risk rating tool (CMT) along with my training and experience to weigh the risk of discharge against the risks of further testing, imaging, or hospitalization. At this time I estimate the risks of additional testing, imaging, or hospitalization to be equal to or greater than the risk of discharge. I discussed my risk assessment with the patient and the patient consents to the risk of discharge as well as the risk of uncertainty in estimating outcomes.    The patient's HEART Score is <4. In rare cases, I give patients with HEART Score of 4 the option of discharge, but only when they meet criteria for \"Low 4,\" meaning that HST was used, and the 4 is not from a highly suspicious story, highly suspicious EKG, or positive cardiac enzymes. In these selected cases, the risk of a \"Low 4\" is still most likely lower than the risk of admission and further testing/imaging. " [id number]        Procedure  Procedures     Irving Griffin DO  02/25/25 0217